# Patient Record
Sex: FEMALE | Race: WHITE | NOT HISPANIC OR LATINO
[De-identification: names, ages, dates, MRNs, and addresses within clinical notes are randomized per-mention and may not be internally consistent; named-entity substitution may affect disease eponyms.]

---

## 2017-04-05 PROBLEM — Z00.00 ENCOUNTER FOR PREVENTIVE HEALTH EXAMINATION: Status: ACTIVE | Noted: 2017-04-05

## 2017-11-06 ENCOUNTER — RESULT REVIEW (OUTPATIENT)
Age: 47
End: 2017-11-06

## 2020-10-23 ENCOUNTER — TRANSCRIPTION ENCOUNTER (OUTPATIENT)
Age: 50
End: 2020-10-23

## 2020-10-23 ENCOUNTER — INPATIENT (INPATIENT)
Facility: HOSPITAL | Age: 50
LOS: 1 days | Discharge: HOME | DRG: 571 | End: 2020-10-25
Attending: INTERNAL MEDICINE | Admitting: INTERNAL MEDICINE
Payer: COMMERCIAL

## 2020-10-23 VITALS
TEMPERATURE: 98 F | SYSTOLIC BLOOD PRESSURE: 116 MMHG | WEIGHT: 145.06 LBS | HEART RATE: 86 BPM | HEIGHT: 61 IN | OXYGEN SATURATION: 98 % | RESPIRATION RATE: 18 BRPM | DIASTOLIC BLOOD PRESSURE: 82 MMHG

## 2020-10-23 DIAGNOSIS — L03.114 CELLULITIS OF LEFT UPPER LIMB: ICD-10-CM

## 2020-10-23 DIAGNOSIS — Z98.891 HISTORY OF UTERINE SCAR FROM PREVIOUS SURGERY: Chronic | ICD-10-CM

## 2020-10-23 LAB
ALBUMIN SERPL ELPH-MCNC: 4.1 G/DL — SIGNIFICANT CHANGE UP (ref 3.3–5)
ALP SERPL-CCNC: 56 U/L — SIGNIFICANT CHANGE UP (ref 40–120)
ALT FLD-CCNC: 19 U/L — SIGNIFICANT CHANGE UP (ref 12–78)
ANION GAP SERPL CALC-SCNC: 3 MMOL/L — LOW (ref 5–17)
APTT BLD: 31.2 SEC — SIGNIFICANT CHANGE UP (ref 27.5–35.5)
AST SERPL-CCNC: 15 U/L — SIGNIFICANT CHANGE UP (ref 15–37)
BASOPHILS # BLD AUTO: 0.04 K/UL — SIGNIFICANT CHANGE UP (ref 0–0.2)
BASOPHILS NFR BLD AUTO: 0.5 % — SIGNIFICANT CHANGE UP (ref 0–2)
BILIRUB SERPL-MCNC: 0.5 MG/DL — SIGNIFICANT CHANGE UP (ref 0.2–1.2)
BUN SERPL-MCNC: 12 MG/DL — SIGNIFICANT CHANGE UP (ref 7–23)
CALCIUM SERPL-MCNC: 8.9 MG/DL — SIGNIFICANT CHANGE UP (ref 8.5–10.1)
CHLORIDE SERPL-SCNC: 115 MMOL/L — HIGH (ref 96–108)
CK SERPL-CCNC: 65 U/L — SIGNIFICANT CHANGE UP (ref 26–192)
CO2 SERPL-SCNC: 24 MMOL/L — SIGNIFICANT CHANGE UP (ref 22–31)
CREAT SERPL-MCNC: 0.69 MG/DL — SIGNIFICANT CHANGE UP (ref 0.5–1.3)
CRP SERPL-MCNC: 1.27 MG/DL — HIGH (ref 0–0.4)
EOSINOPHIL # BLD AUTO: 0.16 K/UL — SIGNIFICANT CHANGE UP (ref 0–0.5)
EOSINOPHIL NFR BLD AUTO: 2.2 % — SIGNIFICANT CHANGE UP (ref 0–6)
ERYTHROCYTE [SEDIMENTATION RATE] IN BLOOD: 9 MM/HR — SIGNIFICANT CHANGE UP (ref 0–15)
GLUCOSE SERPL-MCNC: 73 MG/DL — SIGNIFICANT CHANGE UP (ref 70–99)
HCT VFR BLD CALC: 37.7 % — SIGNIFICANT CHANGE UP (ref 34.5–45)
HGB BLD-MCNC: 13.5 G/DL — SIGNIFICANT CHANGE UP (ref 11.5–15.5)
IMM GRANULOCYTES NFR BLD AUTO: 0.4 % — SIGNIFICANT CHANGE UP (ref 0–1.5)
INR BLD: 0.98 RATIO — SIGNIFICANT CHANGE UP (ref 0.88–1.16)
LACTATE SERPL-SCNC: 0.7 MMOL/L — SIGNIFICANT CHANGE UP (ref 0.7–2)
LYMPHOCYTES # BLD AUTO: 1.9 K/UL — SIGNIFICANT CHANGE UP (ref 1–3.3)
LYMPHOCYTES # BLD AUTO: 25.7 % — SIGNIFICANT CHANGE UP (ref 13–44)
MCHC RBC-ENTMCNC: 33.3 PG — SIGNIFICANT CHANGE UP (ref 27–34)
MCHC RBC-ENTMCNC: 35.8 GM/DL — SIGNIFICANT CHANGE UP (ref 32–36)
MCV RBC AUTO: 92.9 FL — SIGNIFICANT CHANGE UP (ref 80–100)
MONOCYTES # BLD AUTO: 0.59 K/UL — SIGNIFICANT CHANGE UP (ref 0–0.9)
MONOCYTES NFR BLD AUTO: 8 % — SIGNIFICANT CHANGE UP (ref 2–14)
NEUTROPHILS # BLD AUTO: 4.66 K/UL — SIGNIFICANT CHANGE UP (ref 1.8–7.4)
NEUTROPHILS NFR BLD AUTO: 63.2 % — SIGNIFICANT CHANGE UP (ref 43–77)
NRBC # BLD: 0 /100 WBCS — SIGNIFICANT CHANGE UP (ref 0–0)
PLATELET # BLD AUTO: 174 K/UL — SIGNIFICANT CHANGE UP (ref 150–400)
POTASSIUM SERPL-MCNC: 4.6 MMOL/L — SIGNIFICANT CHANGE UP (ref 3.5–5.3)
POTASSIUM SERPL-SCNC: 4.6 MMOL/L — SIGNIFICANT CHANGE UP (ref 3.5–5.3)
PROT SERPL-MCNC: 7.3 G/DL — SIGNIFICANT CHANGE UP (ref 6–8.3)
PROTHROM AB SERPL-ACNC: 11.5 SEC — SIGNIFICANT CHANGE UP (ref 10.6–13.6)
RBC # BLD: 4.06 M/UL — SIGNIFICANT CHANGE UP (ref 3.8–5.2)
RBC # FLD: 11.5 % — SIGNIFICANT CHANGE UP (ref 10.3–14.5)
SARS-COV-2 IGG SERPL QL IA: NEGATIVE — SIGNIFICANT CHANGE UP
SARS-COV-2 IGM SERPL IA-ACNC: 0.09 INDEX — SIGNIFICANT CHANGE UP
SARS-COV-2 RNA SPEC QL NAA+PROBE: SIGNIFICANT CHANGE UP
SODIUM SERPL-SCNC: 142 MMOL/L — SIGNIFICANT CHANGE UP (ref 135–145)
WBC # BLD: 7.38 K/UL — SIGNIFICANT CHANGE UP (ref 3.8–10.5)
WBC # FLD AUTO: 7.38 K/UL — SIGNIFICANT CHANGE UP (ref 3.8–10.5)

## 2020-10-23 PROCEDURE — 99284 EMERGENCY DEPT VISIT MOD MDM: CPT

## 2020-10-23 PROCEDURE — 99223 1ST HOSP IP/OBS HIGH 75: CPT | Mod: GC,25

## 2020-10-23 PROCEDURE — 99232 SBSQ HOSP IP/OBS MODERATE 35: CPT

## 2020-10-23 PROCEDURE — 99406 BEHAV CHNG SMOKING 3-10 MIN: CPT

## 2020-10-23 PROCEDURE — 93010 ELECTROCARDIOGRAM REPORT: CPT

## 2020-10-23 PROCEDURE — 73090 X-RAY EXAM OF FOREARM: CPT | Mod: 26,LT

## 2020-10-23 RX ORDER — SODIUM CHLORIDE 9 MG/ML
1000 INJECTION INTRAMUSCULAR; INTRAVENOUS; SUBCUTANEOUS ONCE
Refills: 0 | Status: COMPLETED | OUTPATIENT
Start: 2020-10-23 | End: 2020-10-23

## 2020-10-23 RX ORDER — VANCOMYCIN HCL 1 G
1000 VIAL (EA) INTRAVENOUS ONCE
Refills: 0 | Status: COMPLETED | OUTPATIENT
Start: 2020-10-23 | End: 2020-10-23

## 2020-10-23 RX ORDER — ONDANSETRON 8 MG/1
4 TABLET, FILM COATED ORAL EVERY 6 HOURS
Refills: 0 | Status: DISCONTINUED | OUTPATIENT
Start: 2020-10-23 | End: 2020-10-25

## 2020-10-23 RX ORDER — INFLUENZA VIRUS VACCINE 15; 15; 15; 15 UG/.5ML; UG/.5ML; UG/.5ML; UG/.5ML
0.5 SUSPENSION INTRAMUSCULAR ONCE
Refills: 0 | Status: DISCONTINUED | OUTPATIENT
Start: 2020-10-23 | End: 2020-10-25

## 2020-10-23 RX ORDER — DULOXETINE HYDROCHLORIDE 30 MG/1
0 CAPSULE, DELAYED RELEASE ORAL
Qty: 0 | Refills: 0 | DISCHARGE

## 2020-10-23 RX ORDER — VANCOMYCIN HCL 1 G
1250 VIAL (EA) INTRAVENOUS EVERY 12 HOURS
Refills: 0 | Status: DISCONTINUED | OUTPATIENT
Start: 2020-10-23 | End: 2020-10-25

## 2020-10-23 RX ORDER — PIPERACILLIN AND TAZOBACTAM 4; .5 G/20ML; G/20ML
3.38 INJECTION, POWDER, LYOPHILIZED, FOR SOLUTION INTRAVENOUS ONCE
Refills: 0 | Status: COMPLETED | OUTPATIENT
Start: 2020-10-23 | End: 2020-10-23

## 2020-10-23 RX ORDER — ACETAMINOPHEN 500 MG
650 TABLET ORAL EVERY 6 HOURS
Refills: 0 | Status: DISCONTINUED | OUTPATIENT
Start: 2020-10-23 | End: 2020-10-25

## 2020-10-23 RX ORDER — CEFEPIME 1 G/1
2000 INJECTION, POWDER, FOR SOLUTION INTRAMUSCULAR; INTRAVENOUS EVERY 8 HOURS
Refills: 0 | Status: DISCONTINUED | OUTPATIENT
Start: 2020-10-23 | End: 2020-10-25

## 2020-10-23 RX ORDER — ZONISAMIDE 100 MG
0 CAPSULE ORAL
Qty: 0 | Refills: 0 | DISCHARGE

## 2020-10-23 RX ORDER — TETANUS TOXOID, REDUCED DIPHTHERIA TOXOID AND ACELLULAR PERTUSSIS VACCINE, ADSORBED 5; 2.5; 8; 8; 2.5 [IU]/.5ML; [IU]/.5ML; UG/.5ML; UG/.5ML; UG/.5ML
0.5 SUSPENSION INTRAMUSCULAR ONCE
Refills: 0 | Status: COMPLETED | OUTPATIENT
Start: 2020-10-23 | End: 2020-10-23

## 2020-10-23 RX ORDER — ZONISAMIDE 100 MG
3 CAPSULE ORAL
Qty: 0 | Refills: 0 | DISCHARGE

## 2020-10-23 RX ORDER — DULOXETINE HYDROCHLORIDE 30 MG/1
1 CAPSULE, DELAYED RELEASE ORAL
Qty: 0 | Refills: 0 | DISCHARGE

## 2020-10-23 RX ADMIN — PIPERACILLIN AND TAZOBACTAM 200 GRAM(S): 4; .5 INJECTION, POWDER, LYOPHILIZED, FOR SOLUTION INTRAVENOUS at 11:43

## 2020-10-23 RX ADMIN — Medication 166.67 MILLIGRAM(S): at 22:44

## 2020-10-23 RX ADMIN — Medication 250 MILLIGRAM(S): at 11:44

## 2020-10-23 RX ADMIN — PIPERACILLIN AND TAZOBACTAM 3.38 GRAM(S): 4; .5 INJECTION, POWDER, LYOPHILIZED, FOR SOLUTION INTRAVENOUS at 11:43

## 2020-10-23 RX ADMIN — SODIUM CHLORIDE 1000 MILLILITER(S): 9 INJECTION INTRAMUSCULAR; INTRAVENOUS; SUBCUTANEOUS at 11:40

## 2020-10-23 RX ADMIN — CEFEPIME 100 MILLIGRAM(S): 1 INJECTION, POWDER, FOR SOLUTION INTRAMUSCULAR; INTRAVENOUS at 14:44

## 2020-10-23 RX ADMIN — SODIUM CHLORIDE 1000 MILLILITER(S): 9 INJECTION INTRAMUSCULAR; INTRAVENOUS; SUBCUTANEOUS at 12:40

## 2020-10-23 RX ADMIN — CEFEPIME 100 MILLIGRAM(S): 1 INJECTION, POWDER, FOR SOLUTION INTRAMUSCULAR; INTRAVENOUS at 21:53

## 2020-10-23 RX ADMIN — TETANUS TOXOID, REDUCED DIPHTHERIA TOXOID AND ACELLULAR PERTUSSIS VACCINE, ADSORBED 0.5 MILLILITER(S): 5; 2.5; 8; 8; 2.5 SUSPENSION INTRAMUSCULAR at 18:26

## 2020-10-23 RX ADMIN — Medication 1000 MILLIGRAM(S): at 12:35

## 2020-10-23 NOTE — ED PROVIDER NOTE - OBJECTIVE STATEMENT
49 female presents to ER c/o infection to left forearm. Patient states last week she woke up with a bump on her left forearm, thinks it may have been a bug bite, wound had gotten worse and went to urgent care on Tuesday, started on Augmentin, went back to urgent care on Thursday and noted not improving and recommended patient see Dr. Kong for wound care management.

## 2020-10-23 NOTE — PATIENT PROFILE ADULT - NSTRANSFEREYEGLASSESPAIRS_GEN_A_NUR
I will SWITCH the dose or number of times a day I take the medications listed below when I get home from the hospital:    metoprolol succinate 100 mg oral tablet, extended release  -- 1 tab(s) by mouth once a day 1 pair

## 2020-10-23 NOTE — CONSULT NOTE ADULT - SUBJECTIVE AND OBJECTIVE BOX
[facility]  INFECTIOUS DISEASES   [address] [place] [address]  Tel: [phone]     Fax: [phone]  =======================================================    MRN-[id number]  GREGORIO MCKEON     CC: left forearm cellulitis    HPI: 48y/o woman with migraine headaches was admitted today with Left forearm wound and cellulitis. She noted a small bug bite area on back of her left forearm on 10/16 that started getting worse after few days, so saw her PCP and also Dr. Kong surgeon, started Augmentin on 10/20 but after 3 days the wound and swelling got worse and started going up proximally. No fever or chills. Doesn't work with chemicals. Has not manipulate the area.      PAST MEDICAL & SURGICAL HISTORY:  Depression  Migraines    Social Hx: active smoker, no ETOH or drugs     FAMILY HISTORY:  None    Allergies  Bananas (Short breath)  No Known Drug Allergies    Antibiotics:  One dose vancomycin and one dose zosyn given.     MEDICATIONS  (PRN):  acetaminophen   Tablet .. 650 milliGRAM(s) Oral every 6 hours PRN Mild Pain (1 - 3)  ondansetron Injectable 4 milliGRAM(s) IV Push every 6 hours PRN Nausea       REVIEW OF SYSTEMS:  CONSTITUTIONAL:  No Fever or chills  HEENT:  No diplopia or blurred vision.  No sore throat or runny nose.  CARDIOVASCULAR:  No chest pain or SOB.  RESPIRATORY:  No cough, shortness of breath, PND or orthopnea.  GASTROINTESTINAL:  No nausea, vomiting or diarrhea.  GENITOURINARY:  No dysuria, frequency or urgency. No Blood in urine  MUSCULOSKELETAL:  no joint aches, no muscle pain  SKIN:  No change in skin, hair or nails.  NEUROLOGIC:  No paresthesias, fasciculations, seizures or weakness.  PSYCHIATRIC:  No disorder of thought or mood.  ENDOCRINE:  No heat or cold intolerance, polyuria or polydipsia.  HEMATOLOGICAL:  No easy bruising or bleeding.     Physical Exam:  Vital Signs Last 24 Hrs  T(C): 36.7 (23 Oct 2020 10:55), Max: 36.7 (23 Oct 2020 10:55)  T(F): 98 (23 Oct 2020 10:55), Max: 98 (23 Oct 2020 10:55)  HR: 86 (23 Oct 2020 10:55) (86 - 86)  BP: 116/82 (23 Oct 2020 10:55) (116/82 - 116/82)  RR: 18 (23 Oct 2020 10:55) (18 - 18)  SpO2: 98% (23 Oct 2020 10:55) (98% - 98%)  Height (cm): 154.9 (10-23 @ 10:55)  Weight (kg): 65.8 (10-23 @ 10:55)  BMI (kg/m2): 27.4 (10-23 @ 10:55)  BSA (m2): 1.65 (10-23 @ 10:55)  GEN: NAD  HEENT: normocephalic and atraumatic. EOMI. PERRL.    NECK: Supple.  No lymphadenopathy   LUNGS: Clear to auscultation.  HEART: Regular rate and rhythm without murmur.  ABDOMEN: Soft, nontender, and nondistended.  Positive bowel sounds.    : No CVA tenderness  EXTREMITIES: Legs Without edema. Dorsal side of Left forearm with an ulcer about 2s3huia, heavy thick pus with expressing surrounding   NEUROLOGIC: grossly intact.  PSYCHIATRIC: Appropriate affect .  SKIN: as above     Labs:  10-23    142  |  115<H>  |  12  ----------------------------<  73  4.6   |  24  |  0.69    Ca    8.9      23 Oct 2020 11:46    TPro  7.3  /  Alb  4.1  /  TBili  0.5  /  DBili  x   /  AST  15  /  ALT  19  /  AlkPhos  56  10-23                        13.5   7.38  )-----------( 174      ( 23 Oct 2020 11:46 )             37.7     PT/INR - ( 23 Oct 2020 11:46 )   PT: 11.5 sec;   INR: 0.98 ratio    PTT - ( 23 Oct 2020 11:46 )  PTT:31.2 sec    LIVER FUNCTIONS - ( 23 Oct 2020 11:46 )  Alb: 4.1 g/dL / Pro: 7.3 g/dL / ALK PHOS: 56 U/L / ALT: 19 U/L / AST: 15 U/L / GGT: x           CARDIAC MARKERS ( 23 Oct 2020 11:46 )  x     / x     / 65 U/L / x     / x        Sedimentation Rate, Erythrocyte: 9 mm/hr (10-23-20 @ 11:46)    All imaging and other data have been reviewed.      Assessment and Plan:   48y/o woman with migraine headaches was admitted today with Left forearm wound and cellulitis.   Heavy pus came out with expressing that was sent for culture. MRSA is a possibility, will cover broadly until culture is back.   Doesn't look like lyme, usually we have target lesions with no ulceration. No travel to suspect endemic etiologies such as leishmaniasis.     Left forearm cellulitis and ulcer   - Blood culture is done  - Just sent wound culture (clean sample from pus, underneath the ulcer)  - Labs are pending, so far no leukocytosis  - Agree with vancomycin 1250mg q12, will follow trough level and keep 15 to 20.  - Will add cefepime 2gm q8h   - based on culture result will switch antibiotic regimen.     Thank you for courtesy of this consult.     Will follow.    Sanket Garcia MD  Division of Infectious Diseases   Cell [phone] between 8am and 6pm   After 6pm and weekends please call ID service at [phone].

## 2020-10-23 NOTE — CONSULT NOTE ADULT - ASSESSMENT
Agree with the above assessment, patient seen and examined in the ER.      Dr. Alanis covering the weekend and I  have discussed the case with him at length.

## 2020-10-23 NOTE — H&P ADULT - ASSESSMENT
Ms Sousa is a 48 yo F presenting from home with L arm pain and redness found to have cellulitis. PMH Depression, Anxiety, Tobacco dependence.     L forearm cellulitis: Ms Sousa is a 50 yo F presenting from home with L arm pain and redness found to have cellulitis. PMH Depression, Anxiety, Tobacco dependence.     L forearm cellulitis: does not meet sepsis criteria. Xray shows no evidence of osteomyelitis.   -monitor for fever and trend WBC count.   -continue vanco and cefepime as recommended by ID.   -f/u blood and wound cultures.   -f/u vanco trough.   -surgery and ID following.     Tobacco dependence: Patient was counselled on tobacco cessation. Shewas informed of the increased risk of lung cancer and cardiovascular disease, COPD among other health risks associated with smoking and tobacco use. Cessation was advised. 3 min spent on counselling. Does not want nicotine replacement.     Depression: continue Cymbalta.     DVT ppx: SCD's

## 2020-10-23 NOTE — ED ADULT NURSE REASSESSMENT NOTE - NS ED NURSE REASSESS COMMENT FT1
Pt has wound on L hand it is wrapped with gauze cling. Pt denies any pain at this time, fever and chills. VSS will reassess.

## 2020-10-23 NOTE — ED ADULT NURSE NOTE - NS ED NURSE RECORD ANOTHER HT AND WT
Medications verified, no changes.  Denies known Latex allergy or symptoms of Latex sensitivity.     Yes

## 2020-10-23 NOTE — CONSULT NOTE ADULT - SUBJECTIVE AND OBJECTIVE BOX
49 Y.O. old right hand dominant Female presents to [place] ED with 1 week hx of left forearm cellulitis. Pt reports she thinks "she may have been bit by a mosquito" ~ 1 week ago that may have been the inciting event. Pt reports pain and swelling has progressively worsened and patient was seen in [facility] and started on Augmentin on 10/20/20. Pt reports after 48 hrs of PO Augmentin, she returned to [facility] for a wound check and was told to report to ED since wound had not improved. Pt denies any numbness/paresthesias. Denies any associated fevers/chills. Denies any CP, SOB, cough, sputum production or sick contacts. Pt reports she does have pets (cat, dog, and hamster) but does report any bites/scratches from her pets. Pt is a current everyday smoker (~1/2 PPD x 10 years). Pt reports wound has been draining serous yellow fluid and she has been performing daily dressing changes after showering and applying bacitracin daily.    PAST MEDICAL & SURGICAL HISTORY:  Depression  Migraines     x 2     FAMILY HISTORY:    ( )Tobacco- 1/2 PPD x 10 years  ( )Etoh- Denies  ( )Drugs- Denies    Bananas (Short breath)  No Known Drug Allergies    Home Medications:  Cymbalta:  (23 Oct 2020 12:16)  zonisamide:  (23 Oct 2020 12:16)    MEDICATIONS  (STANDING):    MEDICATIONS  (PRN):  acetaminophen   Tablet .. 650 milliGRAM(s) Oral every 6 hours PRN Mild Pain (1 - 3)  ondansetron Injectable 4 milliGRAM(s) IV Push every 6 hours PRN Nausea    REVIEW OF SYSTEM:  CONSTITUTIONAL: No weakness, fevers or chills  EYES/ENT: No visual changes;  No vertigo or throat pain.  NECK: No pain or stiffness  RESPIRATORY: No cough, wheezing, hemoptysis; No shortness of breath  CARDIOVASCULAR: No chest pain or palpitations  GASTROINTESTINAL: No abdominal or epigastric pain. No nausea, vomiting, or hematemesis; No diarrhea or constipation. No melena or hematochezia.  GENITOURINARY: No dysuria, frequency or hematuria  NEUROLOGICAL: No numbness or weakness  SKIN: See HPI  All other review of systems is negative unless indicated above.      Allergic/Immunologic:	    Vital Signs Last 24 Hrs  T(C): 36.7 (23 Oct 2020 10:55), Max: 36.7 (23 Oct 2020 10:55)  T(F): 98 (23 Oct 2020 10:55), Max: 98 (23 Oct 2020 10:55)  HR: 86 (23 Oct 2020 10:55) (86 - 86)  BP: 116/82 (23 Oct 2020 10:55) (116/82 - 116/82)  BP(mean): --  RR: 18 (23 Oct 2020 10:55) (18 - 18)  SpO2: 98% (23 Oct 2020 10:55) (98% - 98%)    I&O's Detail    PHYSICAL EXAM:  GENERAL: NAD, well-groomed, well-developed  HEAD:  Atraumatic, Normocephalic  EYES: EOMI, conjunctiva and sclera clear  ENMT: Moist mucous membranes  NECK: Supple, No JVD  HEART: Regular rate and rhythm; No murmurs, rubs, or gallops  RESPIRATORY: CTA B/L, No W/R/R  ABDOMEN: Soft, Nontender, Nondistended; Bowel sounds present  NEUROLOGY: A&Ox3, nonfocal  EXTREMITIES: Left upper extremity- ~ (+) 5cm round open wound to dorsal aspect of Left forearm over the distal radius region with fibrinous tissue at base with ulcerated wound edges, +surrounding erythema to wound edges, mild expressible serous yellow drainage. No tracking erythema. Compartments soft.  NLST C4-S1  5/5- EPL/IO/  2+ Radial pulse, brisk cap refill.                          13.5   7.38  )-----------( 174      ( 23 Oct 2020 11:46 )             37.7       10-23    142  |  115<H>  |  12  ----------------------------<  73  4.6   |  24  |  0.69    Ca    8.9      23 Oct 2020 11:46    TPro  7.3  /  Alb  4.1  /  TBili  0.5  /  DBili  x   /  AST  15  /  ALT  19  /  AlkPhos  56  10-23      PT/INR - ( 23 Oct 2020 11:46 )   PT: 11.5 sec;   INR: 0.98 ratio         PTT - ( 23 Oct 2020 11:46 )  PTT:31.2 sec    LIVER FUNCTIONS - ( 23 Oct 2020 11:46 )  Alb: 4.1 g/dL / Pro: 7.3 g/dL / ALK PHOS: 56 U/L / ALT: 19 U/L / AST: 15 U/L / GGT: x          CARDIAC MARKERS ( 23 Oct 2020 11:46 )  x     / x     / 65 U/L / x     / x        Radiology Findings:       A/P 49y F with PMHx of Depression, Migraines and current 1/2 PPD tobacco user presents with worsening Left forearm cellulitis, not responsive to oral antibiotics (augmentin).    -Admit to Medicine for IV antibiotics and observation  Recommend MRSA coverage (started on Vanco and Zosyn) per ED  -ID consult- Dr. Garcia called  -Keep Left upper extremity elevated  -Local wound care  -Will follow

## 2020-10-23 NOTE — CONSULT NOTE ADULT - CONSULT REQUESTED DATE/TIME
[phone] 23-Oct-2020

## 2020-10-23 NOTE — ED ADULT NURSE NOTE - NURSING SKIN WOUND TYPE #1
animal/insect bite/with infection
Pt received sitting upright in chair, awake and cooperative, on room air.

## 2020-10-23 NOTE — ED ADULT TRIAGE NOTE - CHIEF COMPLAINT QUOTE
Pt p/w redness, swelling and pain to LUE from insect bite. Seen at urgent care put on abx tuesday, no improvement.

## 2020-10-23 NOTE — H&P ADULT - NSHPPHYSICALEXAM_GEN_ALL_CORE
T(C): 36.7 (10-23-20 @ 10:55), Max: 36.7 (10-23-20 @ 10:55)  HR: 86 (10-23-20 @ 10:55) (86 - 86)  BP: 116/82 (10-23-20 @ 10:55) (116/82 - 116/82)  RR: 18 (10-23-20 @ 10:55) (18 - 18)  SpO2: 98% (10-23-20 @ 10:55) (98% - 98%)  Wt(kg): --    Physical Exam:   GENERAL: well-groomed, well-developed, NAD  HEENT: head NC/AT; EOM intact, PERRLA, conjunctiva & sclera clear; hearing grossly intact, moist mucous membranes  NECK: supple, no JVD  RESPIRATORY: CTA B/L, no wheezing, rales, rhonchi or rubs  CARDIOVASCULAR: S1&S2, RRR, no murmurs or gallops  ABDOMEN: soft, non-tender, non-distended, + Bowel sounds x4 quadrants, no guarding, rebound or rigidity  MUSCULOSKELETAL:  no clubbing, cyanosis or edema of all 4 extremities  LYMPH: no cervical lymphadenopathy  VASCULAR: Radial pulses 2+ bilaterally, no varicose veins   SKIN: L forearm erythema and swelling  NEUROLOGIC: AA&O X3, CN2-12 intact w/ no focal deficits, no sensory loss, motor Strength 5/5 in UE & LE B/L  Psych: Normal mood and affect, normal behavior

## 2020-10-24 ENCOUNTER — RESULT REVIEW (OUTPATIENT)
Age: 50
End: 2020-10-24

## 2020-10-24 LAB
ANION GAP SERPL CALC-SCNC: 6 MMOL/L — SIGNIFICANT CHANGE UP (ref 5–17)
BASOPHILS # BLD AUTO: 0.02 K/UL — SIGNIFICANT CHANGE UP (ref 0–0.2)
BASOPHILS NFR BLD AUTO: 0.5 % — SIGNIFICANT CHANGE UP (ref 0–2)
BUN SERPL-MCNC: 8 MG/DL — SIGNIFICANT CHANGE UP (ref 7–23)
CALCIUM SERPL-MCNC: 8.5 MG/DL — SIGNIFICANT CHANGE UP (ref 8.5–10.1)
CHLORIDE SERPL-SCNC: 116 MMOL/L — HIGH (ref 96–108)
CO2 SERPL-SCNC: 22 MMOL/L — SIGNIFICANT CHANGE UP (ref 22–31)
CREAT SERPL-MCNC: 0.57 MG/DL — SIGNIFICANT CHANGE UP (ref 0.5–1.3)
EOSINOPHIL # BLD AUTO: 0.2 K/UL — SIGNIFICANT CHANGE UP (ref 0–0.5)
EOSINOPHIL NFR BLD AUTO: 4.7 % — SIGNIFICANT CHANGE UP (ref 0–6)
GLUCOSE SERPL-MCNC: 115 MG/DL — HIGH (ref 70–99)
HCT VFR BLD CALC: 36.5 % — SIGNIFICANT CHANGE UP (ref 34.5–45)
HGB BLD-MCNC: 12.8 G/DL — SIGNIFICANT CHANGE UP (ref 11.5–15.5)
IMM GRANULOCYTES NFR BLD AUTO: 0.2 % — SIGNIFICANT CHANGE UP (ref 0–1.5)
LYMPHOCYTES # BLD AUTO: 1.45 K/UL — SIGNIFICANT CHANGE UP (ref 1–3.3)
LYMPHOCYTES # BLD AUTO: 34.1 % — SIGNIFICANT CHANGE UP (ref 13–44)
MCHC RBC-ENTMCNC: 32.8 PG — SIGNIFICANT CHANGE UP (ref 27–34)
MCHC RBC-ENTMCNC: 35.1 GM/DL — SIGNIFICANT CHANGE UP (ref 32–36)
MCV RBC AUTO: 93.6 FL — SIGNIFICANT CHANGE UP (ref 80–100)
MONOCYTES # BLD AUTO: 0.28 K/UL — SIGNIFICANT CHANGE UP (ref 0–0.9)
MONOCYTES NFR BLD AUTO: 6.6 % — SIGNIFICANT CHANGE UP (ref 2–14)
NEUTROPHILS # BLD AUTO: 2.29 K/UL — SIGNIFICANT CHANGE UP (ref 1.8–7.4)
NEUTROPHILS NFR BLD AUTO: 53.9 % — SIGNIFICANT CHANGE UP (ref 43–77)
NRBC # BLD: 0 /100 WBCS — SIGNIFICANT CHANGE UP (ref 0–0)
PLATELET # BLD AUTO: 157 K/UL — SIGNIFICANT CHANGE UP (ref 150–400)
POTASSIUM SERPL-MCNC: 3.8 MMOL/L — SIGNIFICANT CHANGE UP (ref 3.5–5.3)
POTASSIUM SERPL-SCNC: 3.8 MMOL/L — SIGNIFICANT CHANGE UP (ref 3.5–5.3)
RBC # BLD: 3.9 M/UL — SIGNIFICANT CHANGE UP (ref 3.8–5.2)
RBC # FLD: 11.4 % — SIGNIFICANT CHANGE UP (ref 10.3–14.5)
SODIUM SERPL-SCNC: 144 MMOL/L — SIGNIFICANT CHANGE UP (ref 135–145)
VANCOMYCIN TROUGH SERPL-MCNC: 12.8 UG/ML — SIGNIFICANT CHANGE UP (ref 10–20)
WBC # BLD: 4.25 K/UL — SIGNIFICANT CHANGE UP (ref 3.8–10.5)
WBC # FLD AUTO: 4.25 K/UL — SIGNIFICANT CHANGE UP (ref 3.8–10.5)

## 2020-10-24 PROCEDURE — 88304 TISSUE EXAM BY PATHOLOGIST: CPT | Mod: 26

## 2020-10-24 PROCEDURE — 25028 I&D F/ARM&/WRST DP ABSC/HMTM: CPT

## 2020-10-24 PROCEDURE — 99233 SBSQ HOSP IP/OBS HIGH 50: CPT | Mod: GC

## 2020-10-24 PROCEDURE — 99232 SBSQ HOSP IP/OBS MODERATE 35: CPT

## 2020-10-24 RX ORDER — ZONISAMIDE 100 MG
300 CAPSULE ORAL AT BEDTIME
Refills: 0 | Status: DISCONTINUED | OUTPATIENT
Start: 2020-10-24 | End: 2020-10-25

## 2020-10-24 RX ORDER — DULOXETINE HYDROCHLORIDE 30 MG/1
60 CAPSULE, DELAYED RELEASE ORAL AT BEDTIME
Refills: 0 | Status: DISCONTINUED | OUTPATIENT
Start: 2020-10-24 | End: 2020-10-25

## 2020-10-24 RX ADMIN — Medication 166.67 MILLIGRAM(S): at 11:10

## 2020-10-24 RX ADMIN — CEFEPIME 100 MILLIGRAM(S): 1 INJECTION, POWDER, FOR SOLUTION INTRAMUSCULAR; INTRAVENOUS at 05:01

## 2020-10-24 RX ADMIN — DULOXETINE HYDROCHLORIDE 60 MILLIGRAM(S): 30 CAPSULE, DELAYED RELEASE ORAL at 21:06

## 2020-10-24 RX ADMIN — CEFEPIME 100 MILLIGRAM(S): 1 INJECTION, POWDER, FOR SOLUTION INTRAMUSCULAR; INTRAVENOUS at 21:06

## 2020-10-24 RX ADMIN — Medication 166.67 MILLIGRAM(S): at 23:07

## 2020-10-24 RX ADMIN — CEFEPIME 100 MILLIGRAM(S): 1 INJECTION, POWDER, FOR SOLUTION INTRAMUSCULAR; INTRAVENOUS at 13:36

## 2020-10-24 RX ADMIN — Medication 300 MILLIGRAM(S): at 21:06

## 2020-10-24 NOTE — BRIEF OPERATIVE NOTE - ESTIMATED BLOOD LOSS
----- Message from Juan Quiroz sent at 7/10/2018 12:28 PM CDT -----  Contact: Patient [phone]  Patient requesting Order for New Prescription Glasses stating is the first time ever putting in this request was informed by Wife of patient that was an option to receive through Insurance [employer], patient requesting a call back to inform outcome.    Pharmacy: [facility] #[id number] - [place] - [address] [facility] [address] [phone] (Phone) [phone] (Fax    Please call an advise  Thank you          
Instructed patient to call [email or web address] to see with eye doctor would be covered through their company.  
5

## 2020-10-24 NOTE — PROGRESS NOTE ADULT - ATTENDING COMMENTS
48 yo F  PMH Depression, Anxiety, Tobacco dependence presenting from home with L arm pain and redness found to have cellulitis. Plan: cont iv antibx, apprec ID recs, f/u cx, apprec gen surg recs, monitor clinical course, dc once sensitivies onboard

## 2020-10-24 NOTE — PROGRESS NOTE ADULT - ASSESSMENT
Ms Sousa is a 50 yo F  PMH Depression, Anxiety, Tobacco dependence presenting from home with L arm pain and redness found to have cellulitis.     L forearm cellulitis: does not meet sepsis criteria. Xray shows no evidence of osteomyelitis.   -monitor for fever and trend WBC count.   -continue vanco and cefepime as recommended by ID.   -f/u blood and wound cultures.   -f/u vanco trough.   -surgery and ID following.     Tobacco dependence: Patient was counselled on tobacco cessation. She was informed of the increased risk of lung cancer and cardiovascular disease, COPD among other health risks associated with smoking and tobacco use. Cessation was advised. 5 min spent on counselling. Does not want nicotine replacement.     Depression: continue Cymbalta.     DVT ppx: SCD's Ms Sousa is a 50 yo F  PMH Depression, Anxiety, Tobacco dependence presenting from home with L arm pain and redness found to have cellulitis.     L forearm cellulitis: does not meet sepsis criteria. Xray shows no evidence of osteomyelitis.   -monitor for fever and trend WBC count.   -continue vanco and cefepime as recommended by ID.   -f/u blood and wound cultures.   -f/u vanco trough.   -surgery and ID following.     Tobacco dependence: Patient was counselled on tobacco cessation. She was informed of the increased risk of lung cancer and cardiovascular disease, COPD among other health risks associated with smoking and tobacco use. Cessation was advised. 5 min spent on counselling. Does not want nicotine replacement.     Depression: continue Cymbalta and zonisamide qhs    DVT ppx: SCD's

## 2020-10-25 ENCOUNTER — TRANSCRIPTION ENCOUNTER (OUTPATIENT)
Age: 50
End: 2020-10-25

## 2020-10-25 VITALS
HEART RATE: 68 BPM | OXYGEN SATURATION: 99 % | RESPIRATION RATE: 16 BRPM | TEMPERATURE: 98 F | SYSTOLIC BLOOD PRESSURE: 130 MMHG | DIASTOLIC BLOOD PRESSURE: 83 MMHG

## 2020-10-25 LAB
ANION GAP SERPL CALC-SCNC: 6 MMOL/L — SIGNIFICANT CHANGE UP (ref 5–17)
BASOPHILS # BLD AUTO: 0.03 K/UL — SIGNIFICANT CHANGE UP (ref 0–0.2)
BASOPHILS NFR BLD AUTO: 0.7 % — SIGNIFICANT CHANGE UP (ref 0–2)
BUN SERPL-MCNC: 14 MG/DL — SIGNIFICANT CHANGE UP (ref 7–23)
CALCIUM SERPL-MCNC: 8.7 MG/DL — SIGNIFICANT CHANGE UP (ref 8.5–10.1)
CHLORIDE SERPL-SCNC: 111 MMOL/L — HIGH (ref 96–108)
CO2 SERPL-SCNC: 26 MMOL/L — SIGNIFICANT CHANGE UP (ref 22–31)
CREAT SERPL-MCNC: 0.7 MG/DL — SIGNIFICANT CHANGE UP (ref 0.5–1.3)
EOSINOPHIL # BLD AUTO: 0.22 K/UL — SIGNIFICANT CHANGE UP (ref 0–0.5)
EOSINOPHIL NFR BLD AUTO: 4.9 % — SIGNIFICANT CHANGE UP (ref 0–6)
GLUCOSE SERPL-MCNC: 92 MG/DL — SIGNIFICANT CHANGE UP (ref 70–99)
HCT VFR BLD CALC: 36.1 % — SIGNIFICANT CHANGE UP (ref 34.5–45)
HGB BLD-MCNC: 12.4 G/DL — SIGNIFICANT CHANGE UP (ref 11.5–15.5)
IMM GRANULOCYTES NFR BLD AUTO: 0.2 % — SIGNIFICANT CHANGE UP (ref 0–1.5)
LYMPHOCYTES # BLD AUTO: 1.83 K/UL — SIGNIFICANT CHANGE UP (ref 1–3.3)
LYMPHOCYTES # BLD AUTO: 40.8 % — SIGNIFICANT CHANGE UP (ref 13–44)
MCHC RBC-ENTMCNC: 32.7 PG — SIGNIFICANT CHANGE UP (ref 27–34)
MCHC RBC-ENTMCNC: 34.3 GM/DL — SIGNIFICANT CHANGE UP (ref 32–36)
MCV RBC AUTO: 95.3 FL — SIGNIFICANT CHANGE UP (ref 80–100)
MONOCYTES # BLD AUTO: 0.45 K/UL — SIGNIFICANT CHANGE UP (ref 0–0.9)
MONOCYTES NFR BLD AUTO: 10 % — SIGNIFICANT CHANGE UP (ref 2–14)
NEUTROPHILS # BLD AUTO: 1.94 K/UL — SIGNIFICANT CHANGE UP (ref 1.8–7.4)
NEUTROPHILS NFR BLD AUTO: 43.4 % — SIGNIFICANT CHANGE UP (ref 43–77)
NRBC # BLD: 0 /100 WBCS — SIGNIFICANT CHANGE UP (ref 0–0)
PLATELET # BLD AUTO: 149 K/UL — LOW (ref 150–400)
POTASSIUM SERPL-MCNC: 4 MMOL/L — SIGNIFICANT CHANGE UP (ref 3.5–5.3)
POTASSIUM SERPL-SCNC: 4 MMOL/L — SIGNIFICANT CHANGE UP (ref 3.5–5.3)
RBC # BLD: 3.79 M/UL — LOW (ref 3.8–5.2)
RBC # FLD: 11.3 % — SIGNIFICANT CHANGE UP (ref 10.3–14.5)
SODIUM SERPL-SCNC: 143 MMOL/L — SIGNIFICANT CHANGE UP (ref 135–145)
WBC # BLD: 4.48 K/UL — SIGNIFICANT CHANGE UP (ref 3.8–10.5)
WBC # FLD AUTO: 4.48 K/UL — SIGNIFICANT CHANGE UP (ref 3.8–10.5)

## 2020-10-25 PROCEDURE — 96375 TX/PRO/DX INJ NEW DRUG ADDON: CPT

## 2020-10-25 PROCEDURE — 86769 SARS-COV-2 COVID-19 ANTIBODY: CPT

## 2020-10-25 PROCEDURE — 99406 BEHAV CHNG SMOKING 3-10 MIN: CPT | Mod: GC

## 2020-10-25 PROCEDURE — U0003: CPT

## 2020-10-25 PROCEDURE — 87186 SC STD MICRODIL/AGAR DIL: CPT

## 2020-10-25 PROCEDURE — 73090 X-RAY EXAM OF FOREARM: CPT

## 2020-10-25 PROCEDURE — 80048 BASIC METABOLIC PNL TOTAL CA: CPT

## 2020-10-25 PROCEDURE — 93005 ELECTROCARDIOGRAM TRACING: CPT

## 2020-10-25 PROCEDURE — 36415 COLL VENOUS BLD VENIPUNCTURE: CPT

## 2020-10-25 PROCEDURE — 80053 COMPREHEN METABOLIC PANEL: CPT

## 2020-10-25 PROCEDURE — 85730 THROMBOPLASTIN TIME PARTIAL: CPT

## 2020-10-25 PROCEDURE — 85652 RBC SED RATE AUTOMATED: CPT

## 2020-10-25 PROCEDURE — 82550 ASSAY OF CK (CPK): CPT

## 2020-10-25 PROCEDURE — 90715 TDAP VACCINE 7 YRS/> IM: CPT

## 2020-10-25 PROCEDURE — 83605 ASSAY OF LACTIC ACID: CPT

## 2020-10-25 PROCEDURE — 87040 BLOOD CULTURE FOR BACTERIA: CPT

## 2020-10-25 PROCEDURE — 80202 ASSAY OF VANCOMYCIN: CPT

## 2020-10-25 PROCEDURE — 96365 THER/PROPH/DIAG IV INF INIT: CPT

## 2020-10-25 PROCEDURE — 87070 CULTURE OTHR SPECIMN AEROBIC: CPT

## 2020-10-25 PROCEDURE — 99239 HOSP IP/OBS DSCHRG MGMT >30: CPT | Mod: GC

## 2020-10-25 PROCEDURE — 86140 C-REACTIVE PROTEIN: CPT

## 2020-10-25 PROCEDURE — 99285 EMERGENCY DEPT VISIT HI MDM: CPT

## 2020-10-25 PROCEDURE — 85610 PROTHROMBIN TIME: CPT

## 2020-10-25 PROCEDURE — 85025 COMPLETE CBC W/AUTO DIFF WBC: CPT

## 2020-10-25 PROCEDURE — 88304 TISSUE EXAM BY PATHOLOGIST: CPT

## 2020-10-25 PROCEDURE — 99232 SBSQ HOSP IP/OBS MODERATE 35: CPT

## 2020-10-25 RX ORDER — AZTREONAM 2 G
1 VIAL (EA) INJECTION
Qty: 14 | Refills: 0
Start: 2020-10-25 | End: 2020-10-31

## 2020-10-25 RX ORDER — ACETAMINOPHEN 500 MG
2 TABLET ORAL
Qty: 112 | Refills: 0
Start: 2020-10-25 | End: 2020-11-07

## 2020-10-25 RX ORDER — LACTOBACILLUS ACIDOPHILUS 100MM CELL
1 CAPSULE ORAL
Qty: 42 | Refills: 0
Start: 2020-10-25 | End: 2020-11-07

## 2020-10-25 RX ORDER — LACTOBACILLUS ACIDOPHILUS 100MM CELL
1 CAPSULE ORAL
Refills: 0 | Status: DISCONTINUED | OUTPATIENT
Start: 2020-10-25 | End: 2020-10-25

## 2020-10-25 RX ADMIN — Medication 166.67 MILLIGRAM(S): at 11:39

## 2020-10-25 RX ADMIN — Medication 1 TABLET(S): at 11:40

## 2020-10-25 RX ADMIN — CEFEPIME 100 MILLIGRAM(S): 1 INJECTION, POWDER, FOR SOLUTION INTRAMUSCULAR; INTRAVENOUS at 13:33

## 2020-10-25 RX ADMIN — CEFEPIME 100 MILLIGRAM(S): 1 INJECTION, POWDER, FOR SOLUTION INTRAMUSCULAR; INTRAVENOUS at 06:22

## 2020-10-25 NOTE — DISCHARGE NOTE PROVIDER - NSDCCPCAREPLAN_GEN_ALL_CORE_FT
PRINCIPAL DISCHARGE DIAGNOSIS  Diagnosis: Cellulitis of forearm, left  Assessment and Plan of Treatment: You were diagnosed with an infection of your left forearm. You were evaluated by infectious disease and started on IV antibiotics with improvement. Please continue to take _____________ for ________ days. Please continue to take your probiotic three times a day with meals. Please follow up with your PMD outpatient for further management.         PRINCIPAL DISCHARGE DIAGNOSIS  Diagnosis: Cellulitis of forearm, left  Assessment and Plan of Treatment: You were diagnosed with an infection of your left forearm. You were evaluated by infectious disease and started on IV antibiotics with improvement. Please continue to take bactrim DS twice a day and see Jose Teague in her office on Thursday. Please continue to take your probiotic three times a day with meals. Please follow up with Dr. Kong in his office for management of your wound. Please follow up with your PMD outpatient for further management.         PRINCIPAL DISCHARGE DIAGNOSIS  Diagnosis: Cellulitis of forearm, left  Assessment and Plan of Treatment: You were diagnosed with an infection of your left forearm. You were evaluated by infectious disease and started on IV antibiotics with improvement. Please continue to take bactrim DS twice a day and see Jose Teague in her office on Thursday. Please continue to take your probiotic three times a day with meals. Please follow up with Dr. Mar his office for management of your wound. Please follow up with your PMD outpatient for further management all within 1 week of discharge for hospital discharge follow up appointment.          PRINCIPAL DISCHARGE DIAGNOSIS  Diagnosis: Cellulitis of forearm, left  Assessment and Plan of Treatment: You were diagnosed with an infection of your left forearm. You were evaluated by infectious disease and started on IV antibiotics with improvement. Please continue to take bactrim DS twice a day and see Jose Teague in her office on Thursday. Please continue to take your probiotic three times a day with meals. Please follow up with Dr. Mar his office for management of your wound. Please follow up with your PMD outpatient for further management all within 1 week of discharge for hospital discharge follow up appointment.         SECONDARY DISCHARGE DIAGNOSES  Diagnosis: Nicotine dependence  Assessment and Plan of Treatment: pt was counseled on cessation practice, states she will quit on her own, recommended to follow up with pmd for further cessation counseling

## 2020-10-25 NOTE — PROGRESS NOTE ADULT - SUBJECTIVE AND OBJECTIVE BOX
Subjective: Pt feels better, less pain    Objective:  Vital Signs Last 24 Hrs  T(C): 36.6 (25 Oct 2020 05:30), Max: 36.8 (24 Oct 2020 13:37)  T(F): 97.8 (25 Oct 2020 05:30), Max: 98.3 (24 Oct 2020 20:47)  HR: 68 (25 Oct 2020 05:30) (66 - 74)  BP: 105/66 (25 Oct 2020 05:30) (105/66 - 121/84)  BP(mean): --  RR: 17 (25 Oct 2020 05:30) (16 - 17)  SpO2: 99% (25 Oct 2020 05:30) (99% - 99%)    Heent: MARY LYNCH  Pul: clear   Cor: RSR, without murmurs  Abdomen: benign  Extremities: without edema, motor/sensory intact, without calf pain, Homans sign negative.  Left wrist ulcer cavity opened with decreased swelling and erythema                       12.4   4.48  )-----------( 149      ( 25 Oct 2020 06:25 )             36.1       10-25    143  |  111<H>  |  14  ----------------------------<  92  4.0   |  26  |  0.70    Ca    8.7      25 Oct 2020 06:25    TPro  7.3  /  Alb  4.1  /  TBili  0.5  /  DBili  x   /  AST  15  /  ALT  19  /  AlkPhos  56  10-23        10-24 @ 07:01  -  10-25 @ 07:00  --------------------------------------------------------  IN:    IV PiggyBack: 250 mL    IV PiggyBack: 100 mL  Total IN: 350 mL    OUT:  Total OUT: 0 mL    Total NET: 350 mL          
[facility]  INFECTIOUS DISEASES   [address] [place] [address]  Tel: [phone]     Fax: [phone]  =======================================================    [id number]  GREGORIO MCKEON     Follow up: left forearm cellulitis    Less swollen, no pus. seen with surgery. No fever.   No new complaint     PAST MEDICAL & SURGICAL HISTORY:  Depression  Migraines    Social Hx: active smoker, no ETOH or drugs     FAMILY HISTORY:  None    Allergies  Bananas (Short breath)  No Known Drug Allergies    Antibiotics:  One dose vancomycin and one dose zosyn given.     MEDICATIONS  (PRN):  acetaminophen   Tablet .. 650 milliGRAM(s) Oral every 6 hours PRN Mild Pain (1 - 3)  ondansetron Injectable 4 milliGRAM(s) IV Push every 6 hours PRN Nausea       REVIEW OF SYSTEMS:  CONSTITUTIONAL:  No Fever or chills  HEENT:  No diplopia or blurred vision.  No sore throat or runny nose.  CARDIOVASCULAR:  No chest pain or SOB.  RESPIRATORY:  No cough, shortness of breath, PND or orthopnea.  GASTROINTESTINAL:  No nausea, vomiting or diarrhea.  GENITOURINARY:  No dysuria, frequency or urgency. No Blood in urine  MUSCULOSKELETAL:  no joint aches, no muscle pain  SKIN:  No change in skin, hair or nails.  NEUROLOGIC:  No paresthesias, fasciculations, seizures or weakness.  PSYCHIATRIC:  No disorder of thought or mood.  ENDOCRINE:  No heat or cold intolerance, polyuria or polydipsia.  HEMATOLOGICAL:  No easy bruising or bleeding.     Physical Exam:  Vital Signs Last 24 Hrs  T(C): 36.6 (25 Oct 2020 05:30), Max: 36.8 (24 Oct 2020 13:37)  T(F): 97.8 (25 Oct 2020 05:30), Max: 98.3 (24 Oct 2020 20:47)  HR: 68 (25 Oct 2020 05:30) (66 - 74)  BP: 105/66 (25 Oct 2020 05:30) (105/66 - 121/84)  BP(mean): --  RR: 17 (25 Oct 2020 05:30) (16 - 17)  SpO2: 99% (25 Oct 2020 05:30) (99% - 99%)  GEN: NAD  HEENT: normocephalic and atraumatic. EOMI. PERRL.    NECK: Supple.  No lymphadenopathy   LUNGS: Clear to auscultation.  HEART: Regular rate and rhythm without murmur.  ABDOMEN: Soft, nontender, and nondistended.  Positive bowel sounds.    : No CVA tenderness  EXTREMITIES: Legs Without edema. Dorsal side of Left forearm with an ulcer now open with no pus, less swelling and erythema   NEUROLOGIC: grossly intact.  PSYCHIATRIC: Appropriate affect .  SKIN: as above       Labs:                        12.4   4.48  )-----------( 149      ( 25 Oct 2020 06:25 )             36.1      10-25    143  |  111<H>  |  14  ----------------------------<  92  4.0   |  26  |  0.70    Ca    8.7      25 Oct 2020 06:25    TPro  7.3  /  Alb  4.1  /  TBili  0.5  /  DBili  x   /  AST  15  /  ALT  19  /  AlkPhos  56  10-23      Culture - Blood (collected 10-23-20 @ 16:21)  Source: .Blood Blood-Peripheral    Culture - Blood (collected 10-23-20 @ 16:21)  Source: .Blood Blood-Peripheral    WBC Count: 4.48 K/uL (10-25-20 @ 06:25)  WBC Count: 4.25 K/uL (10-24-20 @ 09:22)  WBC Count: 7.38 K/uL (10-23-20 @ 11:46)    Creatinine, Serum: 0.70 mg/dL (10-25-20 @ 06:25)  Creatinine, Serum: 0.57 mg/dL (10-24-20 @ 09:22)  Creatinine, Serum: 0.69 mg/dL (10-23-20 @ 11:46)    C-Reactive Protein, Serum: 1.27 mg/dL (10-23-20 @ 16:00)    Sedimentation Rate, Erythrocyte: 9 mm/hr (10-23-20 @ 11:46)    COVID-19 PCR: NotDetec (10-23-20 @ 12:28)    All imaging and other data have been reviewed.  < from: Xray Forearm, Left (10.23.20 @ 12:58) >  EXAM:  FOREARM LEFT                        PROCEDURE DATE:  10/23/2020    INTERPRETATION:  CLINICAL INDICATION: 49 years  Female with cellulitis. The right.    COMPARISON: None  AP and lateral radiographs of the left forearm were obtained.  The osseous mineralization is normal.  There is no fracture, subluxation or dislocation. No lytic or blastic lesions are identified.  There is no periosteal reaction or cortical disruption to suggest osteomyelitis.  No soft tissue abnormality is seen.  IMPRESSION:  No radiographic evidence of acute osteomyelitis.      Assessment and Plan:   50y/o woman with migraine headaches was admitted today with Left forearm wound and cellulitis.   Heavy pus came out with expressing that was sent for culture. MRSA is a possibility, will cover broadly until culture is back.   Doesn't look like lyme, usually we have target lesions with no ulceration. No travel to suspect endemic etiologies such as leishmaniasis.     Left forearm cellulitis and ulcer   - Blood culture NGTD  - Wound culture still testing  - No leukocytosis  - Can switch to po bactrim DS 1tab q12   - Will see me on thursday at [place] office     Will sign off please call with any question.     Sanket Garcia MD  Division of Infectious Diseases   Cell [phone] between 8am and 6pm   After 6pm and weekends please call ID service at [phone].
[facility]  INFECTIOUS DISEASES   [address] [place] [address]  Tel: [phone]     Fax: [phone]  =======================================================    [id number]  GREGORIO MCKEON     Follow up: left forearm cellulitis    Less swollen today, surgery was removed the necrotic area and cleaned the wound. Had purulent discharge underneath.   No fever or any other new complaint.     PAST MEDICAL & SURGICAL HISTORY:  Depression  Migraines    Social Hx: active smoker, no ETOH or drugs     FAMILY HISTORY:  None    Allergies  Bananas (Short breath)  No Known Drug Allergies    Antibiotics:  One dose vancomycin and one dose zosyn given.     MEDICATIONS  (PRN):  acetaminophen   Tablet .. 650 milliGRAM(s) Oral every 6 hours PRN Mild Pain (1 - 3)  ondansetron Injectable 4 milliGRAM(s) IV Push every 6 hours PRN Nausea       REVIEW OF SYSTEMS:  CONSTITUTIONAL:  No Fever or chills  HEENT:  No diplopia or blurred vision.  No sore throat or runny nose.  CARDIOVASCULAR:  No chest pain or SOB.  RESPIRATORY:  No cough, shortness of breath, PND or orthopnea.  GASTROINTESTINAL:  No nausea, vomiting or diarrhea.  GENITOURINARY:  No dysuria, frequency or urgency. No Blood in urine  MUSCULOSKELETAL:  no joint aches, no muscle pain  SKIN:  No change in skin, hair or nails.  NEUROLOGIC:  No paresthesias, fasciculations, seizures or weakness.  PSYCHIATRIC:  No disorder of thought or mood.  ENDOCRINE:  No heat or cold intolerance, polyuria or polydipsia.  HEMATOLOGICAL:  No easy bruising or bleeding.     Physical Exam:  Vital Signs Last 24 Hrs  T(C): 36.6 (24 Oct 2020 05:20), Max: 36.8 (23 Oct 2020 21:28)  T(F): 97.9 (24 Oct 2020 05:20), Max: 98.3 (23 Oct 2020 21:28)  HR: 82 (24 Oct 2020 05:20) (57 - 82)  BP: 103/61 (24 Oct 2020 05:20) (103/61 - 150/82)  BP(mean): --  RR: 17 (24 Oct 2020 05:20) (14 - 18)  SpO2: 97% (24 Oct 2020 05:20) (97% - 100%)  GEN: NAD  HEENT: normocephalic and atraumatic. EOMI. PERRL.    NECK: Supple.  No lymphadenopathy   LUNGS: Clear to auscultation.  HEART: Regular rate and rhythm without murmur.  ABDOMEN: Soft, nontender, and nondistended.  Positive bowel sounds.    : No CVA tenderness  EXTREMITIES: Legs Without edema. Dorsal side of Left forearm with an ulcer about 3m4cngo, heavy thick pus with expressing surrounding   NEUROLOGIC: grossly intact.  PSYCHIATRIC: Appropriate affect .  SKIN: as above     Labs:                        12.8   4.25  )-----------( 157      ( 24 Oct 2020 09:22 )             36.5      10-24    144  |  116<H>  |  8   ----------------------------<  115<H>  3.8   |  22  |  0.57    Ca    8.5      24 Oct 2020 09:22    TPro  7.3  /  Alb  4.1  /  TBili  0.5  /  DBili  x   /  AST  15  /  ALT  19  /  AlkPhos  56  10-23      WBC Count: 4.25 K/uL (10-24-20 @ 09:22)  WBC Count: 7.38 K/uL (10-23-20 @ 11:46)    Creatinine, Serum: 0.57 mg/dL (10-24-20 @ 09:22)  Creatinine, Serum: 0.69 mg/dL (10-23-20 @ 11:46)    C-Reactive Protein, Serum: 1.27 mg/dL (10-23-20 @ 16:00)    Sedimentation Rate, Erythrocyte: 9 mm/hr (10-23-20 @ 11:46)    COVID-19 PCR: NotDetec (10-23-20 @ 12:28)    All imaging and other data have been reviewed.  < from: Xray Forearm, Left (10.23.20 @ 12:58) >  EXAM:  FOREARM LEFT                        PROCEDURE DATE:  10/23/2020    INTERPRETATION:  CLINICAL INDICATION: 49 years  Female with cellulitis. The right.    COMPARISON: None  AP and lateral radiographs of the left forearm were obtained.  The osseous mineralization is normal.  There is no fracture, subluxation or dislocation. No lytic or blastic lesions are identified.  There is no periosteal reaction or cortical disruption to suggest osteomyelitis.  No soft tissue abnormality is seen.  IMPRESSION:  No radiographic evidence of acute osteomyelitis.      Assessment and Plan:   50y/o woman with migraine headaches was admitted today with Left forearm wound and cellulitis.   Heavy pus came out with expressing that was sent for culture. MRSA is a possibility, will cover broadly until culture is back.   Doesn't look like lyme, usually we have target lesions with no ulceration. No travel to suspect endemic etiologies such as leishmaniasis.     Left forearm cellulitis and ulcer   - Blood culture NGTD  - Wound culture testing  - No leukocytosis  - Continue vancomycin 1250mg q12, will follow trough level and keep 15 to 20.  - continue cefepime 2gm q8h   - based on culture result will switch antibiotic regimen.     Will follow.    Sanket Garcia MD  Division of Infectious Diseases   Cell [phone] between 8am and 6pm   After 6pm and weekends please call ID service at [phone].
Name: GREGORIO MCKEON  MRN: [id number]  LOCATION: [facility] 3WES [address] W1    ----  Patient is a 49y old  Female who presents with a chief complaint of L arm cellulitis (24 Oct 2020 08:47)      FROM ADMISSION H+P:   HPI:  Ms Mckeon is a 48 yo F presenting from home with L arm pain and redness found to have cellulitis. PMH Depression, Anxiety, Tobacco dependence.   She states that 7 days ago she noted a bump/insect bite on her L forearm, it became progressively more erythematous and swollen and spread toward her L hand. Pain today is 7/10 worse with movement, denies numbness or tingling, sharp pain. She has some drainage from the infection site. She does not know what [if anything] bit her.  Has no other complaints.   Denies fevers, chills, headaches, nausea, vomiting, chest pain, SOB, palpitations, abdominal pain, constipation, diarrhea, melena, hematochezia, dysuria.  (23 Oct 2020 13:07)      ----  INTERVAL HPI/OVERNIGHT EVENTS: Pt seen and evaluated at the bedside. No acute overnight events occurred. Patient reports improvement in pain and swelling in her left arm. Denies fever or chills.     ----  PAST MEDICAL & SURGICAL HISTORY:  Depression    Migraines    S/P         FAMILY HISTORY:  No pertinent family history in first degree relatives        Allergies    Bananas (Short breath)  No Known Drug Allergies    Intolerances        ----  ANTIMICROBIALS:  cefepime   IVPB 2000 milliGRAM(s) IV Intermittent every 8 hours  vancomycin  IVPB 1250 milliGRAM(s) IV Intermittent every 12 hours    CARDIOVASCULAR:    GASTROINTESTINAL:    PULMONARY:      ----  REVIEW OF SYSTEMS:  CONSTITUTIONAL: denies fever, chills, fatigue, weakness  HEENT: denies blurred vision, sore throat  CARDIOVASCULAR: denies chest pain, chest pressure, palpitations  RESPIRATORY: denies shortness of breath, sputum production  GASTROINTESTINAL: denies nausea, vomiting, diarrhea, abdominal pain  NEUROLOGICAL: denies numbness, headache, focal weakness  MUSCULOSKELETAL: as above in HPI    ----  PHYSICAL EXAM:  GENERAL: patient appears well, no acute distress  ENMT: oropharynx clear without erythema, moist mucous membranes  LUNGS: good air entry bilaterally, clear to auscultation, symmetric breath sounds, no wheezing or rhonchi appreciated  HEART: soft S1/S2, regular rate and rhythm, no murmurs noted, no noted edema to b/l LE  GASTROINTESTINAL: abdomen is soft, nontender, nondistended, normoactive bowel sounds, no palpable masses  MUSCULOSKELETAL: no clubbing or cyanosis, no obvious deformity, left forearm dressing c/d/i.   NEUROLOGIC: awake, alert, readily interactive, good muscle tone in 4 extremities    T(C): 36.6 (10-24-20 @ 05:20), Max: 36.8 (10-23-20 @ 21:28)  HR: 82 (10-24-20 @ 05:20) (57 - 86)  BP: 103/61 (10-24-20 @ 05:20) (103/61 - 150/82)  RR: 17 (10-24-20 @ 05:20) (14 - 18)  SpO2: 97% (10-24-20 @ 05:20) (97% - 100%)  Wt(kg): --    ----  INTAKE & OUTPUT:  I&O's Summary    23 Oct 2020 07:01  -  24 Oct 2020 07:00  --------------------------------------------------------  IN: 300 mL / OUT: 0 mL / NET: 300 mL        LABS:                        12.8   4.25  )-----------( 157      ( 24 Oct 2020 09:22 )             36.5     10-24    144  |  116<H>  |  8   ----------------------------<  115<H>  3.8   |  22  |  0.57    Ca    8.5      24 Oct 2020 09:22    TPro  7.3  /  Alb  4.1  /  TBili  0.5  /  DBili  x   /  AST  15  /  ALT  19  /  AlkPhos  56  1023    PT/INR - ( 23 Oct 2020 11:46 )   PT: 11.5 sec;   INR: 0.98 ratio         PTT - ( 23 Oct 2020 11:46 )  PTT:31.2 sec    CAPILLARY BLOOD GLUCOSE          < from: Xray Forearm, Left (10.23.20 @ 12:58) >    EXAM:  FOREARM LEFT                            PROCEDURE DATE:  10/23/2020          INTERPRETATION:  CLINICAL INDICATION: 49 years  Female with cellulitis. The right.    COMPARISON: None    AP and lateral radiographs of the left forearm were obtained.    The osseous mineralization is normal.    There is no fracture, subluxation or dislocation. No lytic or blastic lesions are identified.    There is no periosteal reaction or cortical disruption to suggest osteomyelitis.    No soft tissue abnormality is seen.    IMPRESSION:    No radiographic evidence of acute osteomyelitis.            KENNEY CALVERT MD; Attending Radiologist  This document has been electronically signed. Oct 23 2020  1:11PM    < end of copied text >      ----  Personally reviewed:  Vital sign trends: [x  ] yes    [  ] no     [  ] n/a  Laboratory results: [ x ] yes    [  ] no     [  ] n/a  Radiology results: [ x ] yes    [  ] no     [  ] n/a  Culture results: [ x ] yes    [  ] no     [  ] n/a  Consultant recommendations: [x  ] yes    [  ] no     [  ] n/a        
SUBJECTIVE:  Patient seen and examined at bedside. No overnight events. Patient with no new complaints at this time, feeling better. States her arm feels less swollen and tender.  Patient denies any fevers, chills, chest pain, shortness of breath, nausea, vomiting.    Vital Signs Last 24 Hrs  T(C): 36.6 (24 Oct 2020 05:20), Max: 36.8 (23 Oct 2020 21:28)  T(F): 97.9 (24 Oct 2020 05:20), Max: 98.3 (23 Oct 2020 21:28)  HR: 82 (24 Oct 2020 05:20) (57 - 86)  BP: 103/61 (24 Oct 2020 05:20) (103/61 - 150/82)  BP(mean): --  RR: 17 (24 Oct 2020 05:20) (14 - 18)  SpO2: 97% (24 Oct 2020 05:20) (97% - 100%)    PHYSICAL EXAM:  GENERAL: NAD, resting comfortably in bed   HEAD:  Atraumatic, Normocephalic  LUE: Dressing dry and intact, wrapped with aria. Open wound to dorsal aspect of forearm. Wound with fibrinous tissue at base and ulcerated edges with some surrounding erythema. Small amount of drainage noted. No malodor. Sensation, strength, motor all intact. 2+ radial pulse.   NEUROLOGY: A&O x 3    LABS: 10/24 AM labs pending       ASSESSMENT  49y Female admitted with worsening L forearm cellulitis, not responsive to outpatient oral antibiotics (augmentin), improving as per patient    PLAN  - Continue current care as per primary team  - pain control, supportive care, OOB  - continue abx as per ID. F/U cx  - elevation of LUE and local wound care  - Will discuss with Dr. Alanis (covering for Dr. Kong today).    Surgical Team Contact Information  Spectralink: Ext: [phone] or [phone]  Pager: [phone]
Name: GREGORIO MCKEON  MRN: [id number]  LOCATION: [facility] 3WES [address] W1    ----  Patient is a 50y old  Female who presents with a chief complaint of L arm cellulitis (25 Oct 2020 10:11)      FROM ADMISSION H+P:   HPI:  Ms Mckeon is a 48 yo F presenting from home with L arm pain and redness found to have cellulitis. PMH Depression, Anxiety, Tobacco dependence.   She states that 7 days ago she noted a bump/insect bite on her L forearm, it became progressively more erythematous and swollen and spread toward her L hand. Pain today is 7/10 worse with movement, denies numbness or tingling, sharp pain. She has some drainage from the infection site. She does not know what [if anything] bit her.  Has no other complaints.   Denies fevers, chills, headaches, nausea, vomiting, chest pain, SOB, palpitations, abdominal pain, constipation, diarrhea, melena, hematochezia, dysuria.  (23 Oct 2020 13:07)      ----  INTERVAL HPI/OVERNIGHT EVENTS: Pt seen and evaluated at the bedside. No acute overnight events occurred. Patient feels well this AM. Eager for discharge.     ----  PAST MEDICAL & SURGICAL HISTORY:  Depression    Migraines    S/P         FAMILY HISTORY:  No pertinent family history in first degree relatives        Allergies    Bananas (Short breath)  No Known Drug Allergies    Intolerances        ----  ANTIMICROBIALS:  cefepime   IVPB 2000 milliGRAM(s) IV Intermittent every 8 hours  vancomycin  IVPB 1250 milliGRAM(s) IV Intermittent every 12 hours    CARDIOVASCULAR:    GASTROINTESTINAL:    PULMONARY:      ----  REVIEW OF SYSTEMS:  CONSTITUTIONAL: denies fever, chills, fatigue, weakness  HEENT: denies blurred vision, sore throat  CARDIOVASCULAR: denies chest pain, chest pressure, palpitations  RESPIRATORY: denies shortness of breath, sputum production  GASTROINTESTINAL: denies nausea, vomiting, diarrhea, abdominal pain  NEUROLOGICAL: denies numbness, headache, focal weakness  MUSCULOSKELETAL: denies new joint pain, muscle aches    ----  PHYSICAL EXAM:  GENERAL: patient appears well, no acute distress  ENMT: oropharynx clear without erythema, moist mucous membranes  LUNGS: good air entry bilaterally, clear to auscultation, symmetric breath sounds, no wheezing or rhonchi appreciated  HEART: soft S1/S2, regular rate and rhythm, no murmurs noted, no noted edema to b/l LE  GASTROINTESTINAL: abdomen is soft, nontender, nondistended, normoactive bowel sounds, no palpable masses  MUSCULOSKELETAL: no clubbing or cyanosis, no obvious deformity, left arm dressing c/d/i  NEUROLOGIC: awake, alert, readily interactive, good muscle tone in 4 extremities    T(C): 36.6 (10-25-20 @ 05:30), Max: 36.8 (10-24-20 @ 13:37)  HR: 68 (10-25-20 @ 05:30) (66 - 74)  BP: 105/66 (10-25-20 @ 05:30) (105/66 - 121/84)  RR: 17 (10-25-20 @ 05:30) (16 - 17)  SpO2: 99% (10-25-20 @ 05:30) (99% - 99%)  Wt(kg): --    ----  INTAKE & OUTPUT:  I&O's Summary    24 Oct 2020 07:01  -  25 Oct 2020 07:00  --------------------------------------------------------  IN: 350 mL / OUT: 0 mL / NET: 350 mL        LABS:                        12.4   4.48  )-----------( 149      ( 25 Oct 2020 06:25 )             36.1     1025    143  |  111<H>  |  14  ----------------------------<  92  4.0   |  26  |  0.70    Ca    8.7      25 Oct 2020 06:25    TPro  7.3  /  Alb  4.1  /  TBili  0.5  /  DBili  x   /  AST  15  /  ALT  19  /  AlkPhos  56  10    PT/INR - ( 23 Oct 2020 11:46 )   PT: 11.5 sec;   INR: 0.98 ratio         PTT - ( 23 Oct 2020 11:46 )  PTT:31.2 sec    CAPILLARY BLOOD GLUCOSE            Culture - Blood (collected 10-23-20 @ 16:21)  Source: .Blood Blood-Peripheral  Preliminary Report (10-24-20 @ 17:01):    No growth to date.    Culture - Blood (collected 10-23-20 @ 16:21)  Source: .Blood Blood-Peripheral  Preliminary Report (10-24-20 @ 17:01):    No growth to date.        ----  Personally reviewed:  Vital sign trends: [ x ] yes    [  ] no     [  ] n/a  Laboratory results: [ x ] yes    [  ] no     [  ] n/a  Radiology results: [ x ] yes    [  ] no     [  ] n/a  Culture results: [ x ] yes    [  ] no     [  ] n/a  Consultant recommendations: [x  ] yes    [  ] no     [  ] n/a

## 2020-10-25 NOTE — DISCHARGE NOTE PROVIDER - NSDCMRMEDTOKEN_GEN_ALL_CORE_FT
Ativan 0.5 mg oral tablet: 1 tab(s) orally 2 times a day, As Needed  Cymbalta 60 mg oral delayed release capsule: 1 cap(s) orally once a day  zonisamide 100 mg oral capsule: 3 cap(s) orally once a day (at bedtime)   Ativan 0.5 mg oral tablet: 1 tab(s) orally 2 times a day, As Needed  Bactrim  mg-160 mg oral tablet: 1 tab(s) orally 2 times a day   Cymbalta 60 mg oral delayed release capsule: 1 cap(s) orally once a day  lactobacillus acidophilus oral capsule: 1 tab(s) orally 3 times a day (with meals)   zonisamide 100 mg oral capsule: 3 cap(s) orally once a day (at bedtime)   Ativan 0.5 mg oral tablet: 1 tab(s) orally 2 times a day, As Needed  Bactrim  mg-160 mg oral tablet: 1 tab(s) orally 2 times a day   Cymbalta 60 mg oral delayed release capsule: 1 cap(s) orally once a day  lactobacillus acidophilus oral capsule: 1 tab(s) orally 3 times a day (with meals)   Tylenol Regular Strength 325 mg oral tablet: 2 tab(s) orally every 6 hours, As Needed -moderatePain or  for fever greater than 100.4  zonisamide 100 mg oral capsule: 3 cap(s) orally once a day (at bedtime)   Ativan 0.5 mg oral tablet: 1 tab(s) orally 2 times a day, As Needed  Bactrim  mg-160 mg oral tablet: 1 tab(s) orally 2 times a day   Cymbalta 60 mg oral delayed release capsule: 1 cap(s) orally once a day  lactobacillus acidophilus oral capsule: 1 tab(s) orally 3 times a day (with meals)   Tylenol Regular Strength 325 mg oral tablet: 2 tab(s) orally every 6 hours, As Needed -moderatePain or  for fever greater than 100.4  work note: To whom it may concern,     Please excuse the above patient as she was hospitlized for a medical issue starting 10/23/2020. She will require ongoing care and management. Thank you for your cooperation during her time of absence.   zonisamide 100 mg oral capsule: 3 cap(s) orally once a day (at bedtime)

## 2020-10-25 NOTE — PROGRESS NOTE ADULT - ASSESSMENT
IMPRESSION pt improving  PLAN agree with discharge           antibiotics as per ID           local wound care           follow up with Dr Kong in office

## 2020-10-25 NOTE — DISCHARGE NOTE PROVIDER - PROVIDER TOKENS
PROVIDER:[TOKEN:[[id number]]] PROVIDER:[TOKEN:[[id number]],FOLLOWUP:[1 week]],PROVIDER:[TOKEN:[[id number]],FOLLOWUP:[1 week]] PROVIDER:[TOKEN:[[id number]],FOLLOWUP:[1-3 days]],PROVIDER:[TOKEN:[[id number]],FOLLOWUP:[1-3 days]],FREE:[LAST:[MIrra],FIRST:[Dr Lockwood],PHONE:[(   )    -],FAX:[(   )    -],ADDRESS:[Your primary medical provider for post hospital discharge follow up appointment],FOLLOWUP:[1-3 days],ESTABLISHEDPATIENT:[T]]

## 2020-10-25 NOTE — PROGRESS NOTE ADULT - REASON FOR ADMISSION
L arm cellulitis

## 2020-10-25 NOTE — PHARMACOTHERAPY INTERVENTION NOTE - COMMENTS
Prescriptions filled at [facility].  Prescriptions: Bactrim, Acidophilus  Brought to bedside and counseled.  [facility] name: Shola Conrad, Pharm. D.   Person(s) counseled (translation used?,family member called? if relevant): Patient, at bedside  Counseling materials provided/counseling aids used: medication leaflet  Time spent Counseling: 10 minutes  Counseling provided according to ASHP guidelines including side effects  Notes:

## 2020-10-25 NOTE — DISCHARGE NOTE PROVIDER - HOSPITAL COURSE
HPI:  Ms Sousa is a 50 yo F presenting from home with L arm pain and redness found to have cellulitis. PMH Depression, Anxiety, Tobacco dependence.   She states that 7 days ago she noted a bump/insect bite on her L forearm, it became progressively more erythematous and swollen and spread toward her L hand. Pain today is 7/10 worse with movement, denies numbness or tingling, sharp pain. She has some drainage from the infection site. She does not know what [if anything] bit her.  Has no other complaints.   Denies fevers, chills, headaches, nausea, vomiting, chest pain, SOB, palpitations, abdominal pain, constipation, diarrhea, melena, hematochezia, dysuria.  (23 Oct 2020 13:07)      ---  HOSPITAL COURSE: Patient admitted to [facility] with left forearm cellulitis. She was started on vancomycine and cefepime by JULISSA Garcia. She was evaluated by surgery Dr. Alanis and her wound was debrided at bedside. Her blood cultures were negative. She improved throughout her hospital course and is currently stable for discharge home on PO antibiotics.     ---  CONSULTANTS:   JULISSA Garcia   Surgery Dr. Alanis     ---  TIME SPENT:  I, the attending physician, was physically present for the key portions of the evaluation and management (E/M) service provided. The total amount of time spent reviewing the hospital notes, laboratory values, imaging findings, assessing/counseling the patient, discussing with consultant physicians, social work, nursing staff was -- minutes    ---  Primary care provider was made aware of plan for discharge:      [  ] NO     [  ] YES   HPI:  Ms Sousa is a 48 yo F presenting from home with L arm pain and redness found to have cellulitis. PMH Depression, Anxiety, Tobacco dependence.   She states that 7 days ago she noted a bump/insect bite on her L forearm, it became progressively more erythematous and swollen and spread toward her L hand. Pain today is 7/10 worse with movement, denies numbness or tingling, sharp pain. She has some drainage from the infection site. She does not know what [if anything] bit her.  Has no other complaints.   Denies fevers, chills, headaches, nausea, vomiting, chest pain, SOB, palpitations, abdominal pain, constipation, diarrhea, melena, hematochezia, dysuria.  (23 Oct 2020 13:07)      ---  HOSPITAL COURSE: Patient admitted to [facility] with left forearm cellulitis. She was started on vancomycine and cefepime by JULISSA Garcia. She was evaluated by surgery Dr. Alanis and her wound was debrided at bedside. Her blood cultures were negative. She improved throughout her hospital course and is currently stable for discharge home on PO antibiotics with close follow up with surgery, ID and her primary care provider for further care and management. Pt was given note for work.     ---  CONSULTANTS:   JULISSA Garcia   Surgery Dr. Alanis     ---  TIME SPENT: 48 minutes  I, the attending physician, was physically present for the key portions of the evaluation and management (E/M) service provided. The total amount of time spent reviewing the hospital notes, laboratory values, imaging findings, assessing/counseling the patient, discussing with consultant physicians, social work, nursing staff was  48  minutes     HPI:  Ms Sousa is a 50 yo F presenting from home with L arm pain and redness found to have cellulitis. PMH Depression, Anxiety, Tobacco dependence.   She states that 7 days ago she noted a bump/insect bite on her L forearm, it became progressively more erythematous and swollen and spread toward her L hand. Pain today is 7/10 worse with movement, denies numbness or tingling, sharp pain. She has some drainage from the infection site. She does not know what [if anything] bit her.  Has no other complaints.   Denies fevers, chills, headaches, nausea, vomiting, chest pain, SOB, palpitations, abdominal pain, constipation, diarrhea, melena, hematochezia, dysuria.  (23 Oct 2020 13:07)      ---  HOSPITAL COURSE: Patient admitted to [facility] with left forearm cellulitis. She was started on vancomycine and cefepime by JULISSA Garcia. She was evaluated by surgery Dr. Alanis and her wound was debrided at bedside. Her blood cultures were negative. She improved throughout her hospital course and is currently stable for discharge home on PO antibiotics with close follow up with surgery, ID and her primary care provider for further care and management. Pt was given note for work. Smoking cessation was discussed for approx 5 minutes pt will quit on her own.     ---  CONSULTANTS:   JULISSA Garcia   Surgery Dr. Alanis     ---  TIME SPENT: 48 minutes  I, the attending physician, was physically present for the key portions of the evaluation and management (E/M) service provided. The total amount of time spent reviewing the hospital notes, laboratory values, imaging findings, assessing/counseling the patient, discussing with consultant physicians, social work, nursing staff was  48  minutes

## 2020-10-25 NOTE — PROGRESS NOTE ADULT - ASSESSMENT
Ms Sousa is a 48 yo F  PMH Depression, Anxiety, Tobacco dependence presenting from home with L arm pain and redness found to have cellulitis.     L forearm cellulitis: does not meet sepsis criteria. Xray shows no evidence of osteomyelitis.   -monitor for fever and trend WBC count.   -continue vanco and cefepime as recommended by ID  -dc planning on PO abx   -blood cx NGTD, fu wound cx    -surgery and ID following.     Tobacco dependence: Patient was counselled on tobacco cessation.  Does not want nicotine replacement.     Depression: continue Cymbalta and zonisamide qhs    DVT ppx: SCD's

## 2020-10-25 NOTE — DISCHARGE NOTE PROVIDER - CARE PROVIDERS DIRECT ADDRESSES
,[email or web address],[email or web address] ,[email or web address],DirectAddress_Unknown,DirectAddress_Unknown

## 2020-10-25 NOTE — DISCHARGE NOTE PROVIDER - CARE PROVIDER_API CALL
Saknet Garcia  INFECTIOUS DISEASE  [address], [address]  [place] [address]  Phone: [phone]  Fax: [phone]  Follow Up Time:    Sanket Garcia  INFECTIOUS DISEASE  [address], [address]  [place] [address]  Phone: [phone]  Fax: [phone]  Follow Up Time: 1 week    Jamir Kong (DO)  Surgery  [address] [place] [address], [facility] [address]  [place], [place] [id number]  Phone: [phone]  Fax: [phone]  Follow Up Time: 1 week   Sanket Garcia  INFECTIOUS DISEASE  [address], [address]  [place] [address]  Phone: [phone]  Fax: [phone]  Follow Up Time: 1-3 days    Jamir Kong (DO)  Surgery  [address]  [place], [place] [id number]  Phone: [phone]  Fax: [phone]  Follow Up Time: 1-3 days    Dr Mandie Carrillo  Your primary medical provider for post hospital discharge follow up appointment  Phone: (   )    -  Fax: (   )    -  Established Patient  Follow Up Time: 1-3 days

## 2020-10-25 NOTE — DISCHARGE NOTE NURSING/CASE MANAGEMENT/SOCIAL WORK - PATIENT PORTAL LINK FT
You can access the FollowMyHealth Patient Portal offered by [facility] by registering at the following website: [email or web address]. By joining [facility]’s FollowMyHealth portal, you will also be able to view your health information using other applications (apps) compatible with our system.

## 2020-10-26 PROBLEM — G43.909 MIGRAINE, UNSPECIFIED, NOT INTRACTABLE, WITHOUT STATUS MIGRAINOSUS: Chronic | Status: ACTIVE | Noted: 2020-10-23

## 2020-10-26 PROBLEM — F32.9 MAJOR DEPRESSIVE DISORDER, SINGLE EPISODE, UNSPECIFIED: Chronic | Status: ACTIVE | Noted: 2020-10-23

## 2020-10-26 LAB
-  AMPICILLIN/SULBACTAM: SIGNIFICANT CHANGE UP
-  CEFAZOLIN: SIGNIFICANT CHANGE UP
-  CLINDAMYCIN: SIGNIFICANT CHANGE UP
-  DAPTOMYCIN: SIGNIFICANT CHANGE UP
-  ERYTHROMYCIN: SIGNIFICANT CHANGE UP
-  GENTAMICIN: SIGNIFICANT CHANGE UP
-  LINEZOLID: SIGNIFICANT CHANGE UP
-  OXACILLIN: SIGNIFICANT CHANGE UP
-  PENICILLIN: SIGNIFICANT CHANGE UP
-  RIFAMPIN: SIGNIFICANT CHANGE UP
-  TETRACYCLINE: SIGNIFICANT CHANGE UP
-  TRIMETHOPRIM/SULFAMETHOXAZOLE: SIGNIFICANT CHANGE UP
-  VANCOMYCIN: SIGNIFICANT CHANGE UP
CULTURE RESULTS: SIGNIFICANT CHANGE UP
METHOD TYPE: SIGNIFICANT CHANGE UP
ORGANISM # SPEC MICROSCOPIC CNT: SIGNIFICANT CHANGE UP
ORGANISM # SPEC MICROSCOPIC CNT: SIGNIFICANT CHANGE UP
SPECIMEN SOURCE: SIGNIFICANT CHANGE UP

## 2020-10-27 LAB
-  AMPICILLIN/SULBACTAM: SIGNIFICANT CHANGE UP
-  CEFAZOLIN: SIGNIFICANT CHANGE UP
-  CLINDAMYCIN: SIGNIFICANT CHANGE UP
-  DAPTOMYCIN: SIGNIFICANT CHANGE UP
-  ERYTHROMYCIN: SIGNIFICANT CHANGE UP
-  GENTAMICIN: SIGNIFICANT CHANGE UP
-  LINEZOLID: SIGNIFICANT CHANGE UP
-  OXACILLIN: SIGNIFICANT CHANGE UP
-  PENICILLIN: SIGNIFICANT CHANGE UP
-  RIFAMPIN: SIGNIFICANT CHANGE UP
-  TETRACYCLINE: SIGNIFICANT CHANGE UP
-  TRIMETHOPRIM/SULFAMETHOXAZOLE: SIGNIFICANT CHANGE UP
-  VANCOMYCIN: SIGNIFICANT CHANGE UP
CULTURE RESULTS: SIGNIFICANT CHANGE UP
METHOD TYPE: SIGNIFICANT CHANGE UP
ORGANISM # SPEC MICROSCOPIC CNT: SIGNIFICANT CHANGE UP
ORGANISM # SPEC MICROSCOPIC CNT: SIGNIFICANT CHANGE UP
SPECIMEN SOURCE: SIGNIFICANT CHANGE UP
SURGICAL PATHOLOGY STUDY: SIGNIFICANT CHANGE UP

## 2020-10-28 LAB
CULTURE RESULTS: SIGNIFICANT CHANGE UP
CULTURE RESULTS: SIGNIFICANT CHANGE UP
SPECIMEN SOURCE: SIGNIFICANT CHANGE UP
SPECIMEN SOURCE: SIGNIFICANT CHANGE UP

## 2020-10-29 ENCOUNTER — APPOINTMENT (OUTPATIENT)
Dept: INFECTIOUS DISEASE | Facility: CLINIC | Age: 50
End: 2020-10-29
Payer: COMMERCIAL

## 2020-10-29 VITALS
DIASTOLIC BLOOD PRESSURE: 90 MMHG | HEIGHT: 61 IN | HEART RATE: 82 BPM | BODY MASS INDEX: 25.86 KG/M2 | SYSTOLIC BLOOD PRESSURE: 142 MMHG | TEMPERATURE: 98 F | OXYGEN SATURATION: 97 % | WEIGHT: 137 LBS

## 2020-10-29 DIAGNOSIS — L03.114 CELLULITIS OF LEFT UPPER LIMB: ICD-10-CM

## 2020-10-29 DIAGNOSIS — A49.02 METHICILLIN RESISTANT STAPHYLOCOCCUS AUREUS INFECTION, UNSPECIFIED SITE: ICD-10-CM

## 2020-10-29 PROCEDURE — 99072 ADDL SUPL MATRL&STAF TM PHE: CPT

## 2020-10-29 PROCEDURE — 99213 OFFICE O/P EST LOW 20 MIN: CPT

## 2020-10-29 RX ORDER — SULFAMETHOXAZOLE AND TRIMETHOPRIM 800; 160 MG/1; MG/1
800-160 TABLET ORAL
Qty: 20 | Refills: 0 | Status: ACTIVE | COMMUNITY
Start: 2020-10-29

## 2020-10-29 NOTE — ASSESSMENT
[FreeTextEntry1] : Will continue Bactrim, 4 more days, no need for any changes.\par Daily dressing change, wash and rinse daily, no discharge but will avoid contaminating other part of skin and contact with other people. \par If MRSA cellulitis or abscess recurs will do more work up for colonization and possible decolonization. \par Patient was given the opportunity to ask questions and all questions were answered to their satisfaction.\par Counseling included lab results, differential diagnosis, treatment options, risks and benefits, lifestyle changes, current condition, medications and dose adjustments.\par The patient verbalized understanding.\par  [Treatment Education] : treatment education [Treatment Adherence] : treatment adherence [Rx Dose / Side Effects] : Rx dose/side effects [Risk Reduction] : risk reduction [Drug Interactions / Side Effects] : drug interactions/side effects [Anticipatory Guidance] : anticipatory guidance

## 2020-10-29 NOTE — HISTORY OF PRESENT ILLNESS
[FreeTextEntry1] : 48y/o woman with migraine headaches was admitted to [facility] last week with with Left forearm wound and cellulitis.\par Heavy pus came out with expressing that was sent for culture, grew MRSA. Seen by surgery and necrotic tissue was removed. She was started on Zosyn and Vacon and later Cefepime and vancomycin. Upon discharge with improvement switched to Bactrim. She had negative blood cultures.  \par She was discharged home on 10/26 and now is here for follow up. \par No fever, no side effects with meds. \par The ulcerative area is improving with no complication.\par  Still on bactrim.

## 2020-10-29 NOTE — PHYSICAL EXAM
[General Appearance - Alert] : alert [General Appearance - In No Acute Distress] : in no acute distress [Sclera] : the sclera and conjunctiva were normal [PERRL With Normal Accommodation] : pupils were equal in size, round, reactive to light [Extraocular Movements] : extraocular movements were intact [Outer Ear] : the ears and nose were normal in appearance [Oropharynx] : the oropharynx was normal with no thrush [Auscultation Breath Sounds / Voice Sounds] : lungs were clear to auscultation bilaterally [Heart Rate And Rhythm] : heart rate was normal and rhythm regular [Heart Sounds] : normal S1 and S2 [Heart Sounds Gallop] : no gallops [Murmurs] : no murmurs [Heart Sounds Pericardial Friction Rub] : no pericardial rub [Full Pulse] : the pedal pulses are present [Edema] : there was no peripheral edema [Bowel Sounds] : normal bowel sounds [Abdomen Soft] : soft [Abdomen Tenderness] : non-tender [] : no hepato-splenomegaly [Abdomen Mass (___ Cm)] : no abdominal mass palpated [Costovertebral Angle Tenderness] : no CVA tenderness [No Palpable Adenopathy] : no palpable adenopathy [Musculoskeletal - Swelling] : no joint swelling [Nail Clubbing] : no clubbing  or cyanosis of the fingernails [Motor Tone] : muscle strength and tone were normal [FreeTextEntry1] : open wound about 1inch on dorsal left forearm, minimal yellow discharge. granulation tissue. No cellulitis of surrounding  [Deep Tendon Reflexes (DTR)] : deep tendon reflexes were 2+ and symmetric [Sensation] : the sensory exam was normal to light touch and pinprick [No Focal Deficits] : no focal deficits [Oriented To Time, Place, And Person] : oriented to person, place, and time [Affect] : the affect was normal

## 2020-11-03 ENCOUNTER — APPOINTMENT (OUTPATIENT)
Dept: SURGERY | Facility: CLINIC | Age: 50
End: 2020-11-03
Payer: COMMERCIAL

## 2020-11-03 PROCEDURE — 99024 POSTOP FOLLOW-UP VISIT: CPT

## 2020-11-23 ENCOUNTER — APPOINTMENT (OUTPATIENT)
Dept: SURGERY | Facility: CLINIC | Age: 50
End: 2020-11-23
Payer: COMMERCIAL

## 2020-11-23 PROCEDURE — 99072 ADDL SUPL MATRL&STAF TM PHE: CPT

## 2020-11-23 PROCEDURE — 99213 OFFICE O/P EST LOW 20 MIN: CPT | Mod: 25

## 2020-12-02 ENCOUNTER — RESULT REVIEW (OUTPATIENT)
Age: 50
End: 2020-12-02

## 2021-10-06 ENCOUNTER — NON-APPOINTMENT (OUTPATIENT)
Age: 51
End: 2021-10-06

## 2021-10-07 ENCOUNTER — APPOINTMENT (OUTPATIENT)
Dept: SURGERY | Facility: CLINIC | Age: 51
End: 2021-10-07
Payer: COMMERCIAL

## 2021-10-07 VITALS
OXYGEN SATURATION: 99 % | SYSTOLIC BLOOD PRESSURE: 133 MMHG | HEIGHT: 61 IN | WEIGHT: 140 LBS | BODY MASS INDEX: 26.43 KG/M2 | TEMPERATURE: 98.1 F | DIASTOLIC BLOOD PRESSURE: 95 MMHG | HEART RATE: 92 BPM

## 2021-10-07 PROCEDURE — 99214 OFFICE O/P EST MOD 30 MIN: CPT | Mod: 25

## 2021-10-07 PROCEDURE — 10060 I&D ABSCESS SIMPLE/SINGLE: CPT

## 2021-10-08 NOTE — ASSESSMENT
[FreeTextEntry1] :  A sterile tray was opened.  After an appropriate timeout was performed.  The area was prepped with alcohol and draped in the usual sterile fashion.  It was frozen with ethyl chloride.  It was infiltrated with 1% Xylocaine with epinephrine in a field block fashion.  A cruciate incision was made with a #11 scalpel blade.  Pus was exuded.  Loculations were broken down.  The area was irrigated with normal saline solution.  Sterile dressing was applied.\par \par She was instructed on wound care.  She is to continue with Bactrim.  She is to follow-up in the office in 2 weeks for reevaluation.

## 2021-10-21 ENCOUNTER — APPOINTMENT (OUTPATIENT)
Dept: SURGERY | Facility: CLINIC | Age: 51
End: 2021-10-21
Payer: COMMERCIAL

## 2021-10-21 VITALS
SYSTOLIC BLOOD PRESSURE: 118 MMHG | DIASTOLIC BLOOD PRESSURE: 84 MMHG | HEIGHT: 61 IN | TEMPERATURE: 98.1 F | WEIGHT: 140 LBS | HEART RATE: 95 BPM | OXYGEN SATURATION: 97 % | BODY MASS INDEX: 26.43 KG/M2

## 2021-10-21 DIAGNOSIS — L02.11 CUTANEOUS ABSCESS OF NECK: ICD-10-CM

## 2021-10-21 PROCEDURE — 99213 OFFICE O/P EST LOW 20 MIN: CPT

## 2022-03-17 NOTE — ED ADULT NURSE NOTE - TEMPLATE LIST FOR HEAD TO TOE ASSESSMENT
March 18, 2022    Alicia Soliz  [address]  [address]  [place] [address]             [facility]  [address] [address]  Phone: [phone]  Fax: [phone] To Whom It May Concern:    I have been Ms. Alicia Soliz' primary care physician for over 4 years now. Her current BMI is 47.18 and she has long struggled to lose weight. She does have multiple comorbidities including MS, prediabetes, fatty liver, chronic back pain which would be benefited by weight loss.     Sincerely,       Braden Dumont MD      Wounds

## 2022-08-23 NOTE — ED ADULT NURSE NOTE - NURSING ED SKIN COLOR
Patient requests all Lab, Cardiology, and Radiology Results on their Discharge Instructions
normal for race

## 2022-10-24 ENCOUNTER — APPOINTMENT (OUTPATIENT)
Dept: SURGERY | Facility: CLINIC | Age: 52
End: 2022-10-24

## 2022-10-24 VITALS
WEIGHT: 140 LBS | HEART RATE: 74 BPM | OXYGEN SATURATION: 99 % | HEIGHT: 61 IN | DIASTOLIC BLOOD PRESSURE: 92 MMHG | BODY MASS INDEX: 26.43 KG/M2 | TEMPERATURE: 97.9 F | SYSTOLIC BLOOD PRESSURE: 144 MMHG

## 2022-10-24 DIAGNOSIS — M54.2 CERVICALGIA: ICD-10-CM

## 2022-10-24 PROCEDURE — 99214 OFFICE O/P EST MOD 30 MIN: CPT

## 2024-03-04 NOTE — BRIEF OPERATIVE NOTE - ANTIBIOTIC PROTOCOL
CM consult for stairs at home noted and reviewed. CM to remain available and monitor for home care needs 
on antibiotics/Exception to protocol

## 2025-04-23 ENCOUNTER — APPOINTMENT (OUTPATIENT)
Dept: ORTHOPEDIC SURGERY | Facility: CLINIC | Age: 55
End: 2025-04-23

## 2025-04-29 ENCOUNTER — NON-APPOINTMENT (OUTPATIENT)
Age: 55
End: 2025-04-29

## 2025-05-01 ENCOUNTER — APPOINTMENT (OUTPATIENT)
Dept: ORTHOPEDIC SURGERY | Facility: CLINIC | Age: 55
End: 2025-05-01
Payer: COMMERCIAL

## 2025-05-01 DIAGNOSIS — M79.642 PAIN IN LEFT HAND: ICD-10-CM

## 2025-05-01 DIAGNOSIS — M65.312 TRIGGER THUMB, LEFT THUMB: ICD-10-CM

## 2025-05-01 PROCEDURE — 99203 OFFICE O/P NEW LOW 30 MIN: CPT | Mod: 25

## 2025-05-01 PROCEDURE — 20550 NJX 1 TENDON SHEATH/LIGAMENT: CPT | Mod: LT

## 2025-05-01 RX ORDER — METHYLPRED ACET/NACL,ISO-OS/PF 40 MG/ML
40 VIAL (ML) INJECTION
Refills: 0 | Status: COMPLETED | OUTPATIENT
Start: 2025-05-01

## 2025-05-01 RX ORDER — LIDOCAINE HCL/PF 10 MG/ML
1 VIAL (ML) INJECTION
Refills: 0 | Status: COMPLETED | OUTPATIENT
Start: 2025-05-01

## 2025-05-01 RX ADMIN — LIDOCAINE HYDROCHLORIDE 0.5 %: 10 INJECTION, SOLUTION INFILTRATION; PERINEURAL at 00:00

## 2025-05-01 RX ADMIN — METHYLPREDNISOLONE ACETATE 0.5 MG/ML: 40 INJECTION, SUSPENSION INTRA-ARTICULAR; INTRALESIONAL; INTRAMUSCULAR; SOFT TISSUE at 00:00

## 2025-09-05 ENCOUNTER — NON-APPOINTMENT (OUTPATIENT)
Age: 55
End: 2025-09-05